# Patient Record
Sex: FEMALE | Race: WHITE | NOT HISPANIC OR LATINO | Employment: UNEMPLOYED | URBAN - NONMETROPOLITAN AREA
[De-identification: names, ages, dates, MRNs, and addresses within clinical notes are randomized per-mention and may not be internally consistent; named-entity substitution may affect disease eponyms.]

---

## 2017-01-23 ENCOUNTER — OFFICE VISIT (OUTPATIENT)
Dept: CARDIOLOGY | Facility: CLINIC | Age: 22
End: 2017-01-23

## 2017-01-23 VITALS
WEIGHT: 102 LBS | DIASTOLIC BLOOD PRESSURE: 62 MMHG | HEIGHT: 61 IN | SYSTOLIC BLOOD PRESSURE: 90 MMHG | BODY MASS INDEX: 19.26 KG/M2 | HEART RATE: 80 BPM

## 2017-01-23 DIAGNOSIS — I34.0 NON-RHEUMATIC MITRAL REGURGITATION: ICD-10-CM

## 2017-01-23 DIAGNOSIS — I45.10 INCOMPLETE RBBB: ICD-10-CM

## 2017-01-23 DIAGNOSIS — R00.2 PALPITATIONS: Primary | ICD-10-CM

## 2017-01-23 DIAGNOSIS — Z79.899 MEDICATION MANAGEMENT: ICD-10-CM

## 2017-01-23 PROCEDURE — 99213 OFFICE O/P EST LOW 20 MIN: CPT | Performed by: NURSE PRACTITIONER

## 2017-01-23 RX ORDER — DIGOXIN 250 MCG
250 TABLET ORAL
COMMUNITY
End: 2017-01-23 | Stop reason: SDUPTHER

## 2017-01-23 RX ORDER — DIGOXIN 250 MCG
250 TABLET ORAL
Qty: 30 TABLET | Refills: 11 | Status: SHIPPED | OUTPATIENT
Start: 2017-01-23 | End: 2017-02-22

## 2017-01-23 NOTE — LETTER
January 23, 2017     SOFIYA Preciado  [address] [place] [id number]    Patient: Delroy Robins   YOB: 1995   Date of Visit: 1/23/2017       Dear SOFIYA Machado:    Delroy Robins was in my office today. Below is a copy of my note.    If you have questions, please do not hesitate to call me. I look forward to following Delroy along with you.         Sincerely,        SOFIYA Sterling        CC: No Recipients    Chief Complaint   Patient presents with   • Follow-up     Denies cardiac problems. Asking for refills on digoxin for 30 day supply to [facility]. States it has been over a year since she had labs checked.        Subjective       Delroy Robins is a 21 y.o. female who was diagnosed with some kind of cardiac problems she was around 7 years old and developed palpitations and had been followed by pediatric cardiologist and was started on Lanoxin. She has done fairly well. In 2016, she seen Dr. Abreu to establish cardiac care. An echocardiogram with bubble study was ordered and results negative for shunting of blood. LV ejection fraction was in normal range. Today she comes to the office for a follow up appointment and no complaints are voiced.   HPI         Cardiac History:    Past Surgical History   Procedure Laterality Date   • Echo - converted  01/25/2016     EF 50-55%, negative bubble study, RVSP 19 mmHg       Current Outpatient Prescriptions   Medication Sig Dispense Refill   • digoxin (LANOXIN) 250 MCG tablet Take 1 tablet by mouth Daily for 30 days. 30 tablet 11     No current facility-administered medications for this visit.        Review of patient's allergies indicates no known allergies.    Past Medical History   Diagnosis Date   • Jaw surgery    • Migraine headache    • Staph    • tubes        Social History     Social History   • Marital status: Single     Spouse name: N/A   • Number of children: N/A   • Years of education: N/A     Occupational History  "  • Not on file.     Social History Main Topics   • Smoking status: Never Smoker   • Smokeless tobacco: Never Used   • Alcohol use No   • Drug use: No   • Sexual activity: Not on file     Other Topics Concern   • Not on file     Social History Narrative       History reviewed. No pertinent family history.    Review of Systems   Constitutional: Negative for activity change, fatigue and fever.   HENT: Negative for congestion, sinus pressure and trouble swallowing.    Eyes: Negative for visual disturbance.   Respiratory: Negative for cough, shortness of breath and wheezing.    Cardiovascular: Negative for chest pain, palpitations and leg swelling.   Gastrointestinal: Negative for abdominal distention and abdominal pain.   Endocrine: Negative for polydipsia, polyphagia and polyuria.   Genitourinary: Negative for difficulty urinating and dysuria.   Musculoskeletal: Negative for gait problem and myalgias.   Skin: Negative for color change.   Neurological: Positive for headaches. Negative for dizziness, syncope, weakness and light-headedness.   Psychiatric/Behavioral: Negative for sleep disturbance. The patient is not nervous/anxious.        Diabetes- No  Thyroid-normal    Objective     Visit Vitals   • BP 90/62   • Pulse 80   • Ht 61\" (154.9 cm)   • Wt 102 lb (46.3 kg)   • BMI 19.27 kg/m2       Physical Exam   Constitutional: She is oriented to person, place, and time. She appears well-developed.   Eyes: Pupils are equal, round, and reactive to light.   Neck: Neck supple. No JVD present.   Cardiovascular: Normal rate, regular rhythm, S1 normal, S2 normal and normal pulses.    Murmur heard.   Systolic murmur is present with a grade of 2/6   Pulmonary/Chest: Effort normal and breath sounds normal.   Abdominal: Soft. Bowel sounds are normal.   Musculoskeletal: She exhibits no edema.   Neurological: She is alert and oriented to person, place, and time.   Skin: Skin is warm and dry.   Psychiatric: She has a normal mood and " affect. Her behavior is normal. Judgment and thought content normal.   Vitals reviewed.    Procedures          Assessment/Plan      Delroy was seen today for follow-up.    Diagnoses and all orders for this visit:    Palpitations    Non-rheumatic mitral regurgitation    Medication management    Incomplete RBBB    Other orders  -     digoxin (LANOXIN) 250 MCG tablet; Take 1 tablet by mouth Daily for 30 days.        She will follow with you for management of labs. Please consider digoxin level with next labs drawn. The report of her echocardiogram was reviewed. No new or worsening symptoms reported therefore no further testing ordered. I encoruaged her to maintain weight. We also discussed decreasing yuli and caffeine in her diet. She reports she does not smoke. Refills given to continue digoxin as currently taking. We will see her back in a year or sooner for problems.            Electronically signed by SOFIYA Sterling,  January 23, 2017 12:29 PM

## 2017-01-23 NOTE — MR AVS SNAPSHOT
Delroy Robins   1/23/2017 12:00 PM   Office Visit    Dept Phone:  [phone]   Encounter #:  [id number]    Provider:  SOFIYA Benedict   Department:  [facility] CARDIOLOGY                Your Full Care Plan              Where to Get Your Medications      These medications were sent to [facility] [place], [place] - [address] [place] [address] - [phone] [facility] [phone]   [facility] [address]     Phone:  [phone]     digoxin 250 MCG tablet            Your Updated Medication List          This list is accurate as of: 1/23/17 12:26 PM.  Always use your most recent med list.                digoxin 250 MCG tablet   Commonly known as:  LANOXIN   Take 1 tablet by mouth Daily for 30 days.               You Were Diagnosed With        Codes Comments    Palpitations    -  Primary ICD-10-CM: R00.2  ICD-9-CM: 785.1     Non-rheumatic mitral regurgitation     ICD-10-CM: I34.0  ICD-9-CM: 424.0     Medication management     ICD-10-CM: Z79.899  ICD-9-CM: V58.69     Incomplete RBBB     ICD-10-CM: I45.10  ICD-9-CM: 426.4       Instructions     None    Patient Instructions History      Upcoming Appointments     Visit Type Date Time Department    FOLLOW UP 1/23/2017 12:00 PM [facility]    FOLLOW UP 1/23/2018  2:00 PM [facility]      MyChart Signup     Our records indicate that you have declined [facility] MyChart signup. If you would like to sign up for [employer], please email [email or web address] or call [phone] to obtain an activation code.             Other Info from Your Visit           Your Appointments     Jan 23, 2018  2:00 PM EST   Follow Up with SOFIYA Fernandes   [facility] CARDIOLOGY (--)    [place] [address] [phone]           Arrive 15 minutes prior to appointment.              Allergies     No Known Allergies      Reason for Visit     Follow-up Denies  "cardiac problems. Asking for refills on digoxin for 30 day supply to [place] [facility]. States it has been over a year since she had labs checked.       Vital Signs     Blood Pressure Pulse Height Weight Body Mass Index Smoking Status    90/62 80 61\" (154.9 cm) 102 lb (46.3 kg) 19.27 kg/m2 Never Smoker      Problems and Diagnoses Noted     Palpitations    -  Primary    Non-rheumatic mitral regurgitation        Pharmacologic therapy        Heart block            "

## 2017-01-23 NOTE — PROGRESS NOTES
Chief Complaint   Patient presents with   • Follow-up     Denies cardiac problems. Asking for refills on digoxin for 30 day supply to [facility]. States it has been over a year since she had labs checked.        Subjective       Delroy Robins is a 21 y.o. female who was diagnosed with some kind of cardiac problems she was around 7 years old and developed palpitations and had been followed by pediatric cardiologist and was started on Lanoxin. She has done fairly well. In 2016, she seen Dr. Abreu to establish cardiac care. An echocardiogram with bubble study was ordered and results negative for shunting of blood. LV ejection fraction was in normal range. Today she comes to the office for a follow up appointment and no complaints are voiced.   HPI         Cardiac History:    Past Surgical History   Procedure Laterality Date   • Echo - converted  01/25/2016     EF 50-55%, negative bubble study, RVSP 19 mmHg       Current Outpatient Prescriptions   Medication Sig Dispense Refill   • digoxin (LANOXIN) 250 MCG tablet Take 1 tablet by mouth Daily for 30 days. 30 tablet 11     No current facility-administered medications for this visit.        Review of patient's allergies indicates no known allergies.    Past Medical History   Diagnosis Date   • Jaw surgery    • Migraine headache    • Staph    • tubes        Social History     Social History   • Marital status: Single     Spouse name: N/A   • Number of children: N/A   • Years of education: N/A     Occupational History   • Not on file.     Social History Main Topics   • Smoking status: Never Smoker   • Smokeless tobacco: Never Used   • Alcohol use No   • Drug use: No   • Sexual activity: Not on file     Other Topics Concern   • Not on file     Social History Narrative       History reviewed. No pertinent family history.    Review of Systems   Constitutional: Negative for activity change, fatigue and fever.   HENT: Negative for congestion, sinus pressure and  "trouble swallowing.    Eyes: Negative for visual disturbance.   Respiratory: Negative for cough, shortness of breath and wheezing.    Cardiovascular: Negative for chest pain, palpitations and leg swelling.   Gastrointestinal: Negative for abdominal distention and abdominal pain.   Endocrine: Negative for polydipsia, polyphagia and polyuria.   Genitourinary: Negative for difficulty urinating and dysuria.   Musculoskeletal: Negative for gait problem and myalgias.   Skin: Negative for color change.   Neurological: Positive for headaches. Negative for dizziness, syncope, weakness and light-headedness.   Psychiatric/Behavioral: Negative for sleep disturbance. The patient is not nervous/anxious.        Diabetes- No  Thyroid-normal    Objective     Visit Vitals   • BP 90/62   • Pulse 80   • Ht 61\" (154.9 cm)   • Wt 102 lb (46.3 kg)   • BMI 19.27 kg/m2       Physical Exam   Constitutional: She is oriented to person, place, and time. She appears well-developed.   Eyes: Pupils are equal, round, and reactive to light.   Neck: Neck supple. No JVD present.   Cardiovascular: Normal rate, regular rhythm, S1 normal, S2 normal and normal pulses.    Murmur heard.   Systolic murmur is present with a grade of 2/6   Pulmonary/Chest: Effort normal and breath sounds normal.   Abdominal: Soft. Bowel sounds are normal.   Musculoskeletal: She exhibits no edema.   Neurological: She is alert and oriented to person, place, and time.   Skin: Skin is warm and dry.   Psychiatric: She has a normal mood and affect. Her behavior is normal. Judgment and thought content normal.   Vitals reviewed.    Procedures          Assessment/Plan      Delroy was seen today for follow-up.    Diagnoses and all orders for this visit:    Palpitations    Non-rheumatic mitral regurgitation    Medication management    Incomplete RBBB    Other orders  -     digoxin (LANOXIN) 250 MCG tablet; Take 1 tablet by mouth Daily for 30 days.        She will follow with you for " management of labs. Please consider digoxin level with next labs drawn. The report of her echocardiogram was reviewed. No new or worsening symptoms reported therefore no further testing ordered. I encoruaged her to maintain weight. We also discussed decreasing yuli and caffeine in her diet. She reports she does not smoke. Refills given to continue digoxin as currently taking. We will see her back in a year or sooner for problems.            Electronically signed by SOFIYA Sterling,  January 23, 2017 12:29 PM

## 2018-01-30 ENCOUNTER — OFFICE VISIT (OUTPATIENT)
Dept: CARDIOLOGY | Facility: CLINIC | Age: 23
End: 2018-01-30

## 2018-01-30 VITALS
DIASTOLIC BLOOD PRESSURE: 70 MMHG | HEART RATE: 72 BPM | BODY MASS INDEX: 19.26 KG/M2 | HEIGHT: 61 IN | WEIGHT: 102 LBS | SYSTOLIC BLOOD PRESSURE: 100 MMHG

## 2018-01-30 DIAGNOSIS — R00.2 PALPITATIONS: Primary | ICD-10-CM

## 2018-01-30 DIAGNOSIS — I34.0 NON-RHEUMATIC MITRAL REGURGITATION: ICD-10-CM

## 2018-01-30 PROCEDURE — 99212 OFFICE O/P EST SF 10 MIN: CPT | Performed by: NURSE PRACTITIONER

## 2018-01-30 RX ORDER — DIGOXIN 250 MCG
250 TABLET ORAL
Qty: 30 TABLET | Refills: 8 | Status: SHIPPED | OUTPATIENT
Start: 2018-01-30 | End: 2019-03-18 | Stop reason: SDUPTHER

## 2018-01-30 RX ORDER — DIGOXIN 250 MCG
250 TABLET ORAL
COMMUNITY
End: 2018-01-30 | Stop reason: SDUPTHER

## 2018-01-30 RX ORDER — LEVOFLOXACIN 750 MG/1
750 TABLET ORAL DAILY
COMMUNITY
End: 2018-08-29

## 2018-01-30 RX ORDER — ACETAMINOPHEN 500 MG
500 TABLET ORAL EVERY 6 HOURS PRN
COMMUNITY
End: 2018-08-29

## 2018-01-30 RX ORDER — LANSOPRAZOLE 30 MG/1
30 CAPSULE, DELAYED RELEASE ORAL DAILY
COMMUNITY
End: 2018-08-29

## 2018-01-30 RX ORDER — ONDANSETRON 4 MG/1
4 TABLET, FILM COATED ORAL EVERY 8 HOURS PRN
COMMUNITY
End: 2019-06-03 | Stop reason: ALTCHOICE

## 2018-01-30 RX ORDER — KETOROLAC TROMETHAMINE 10 MG/1
10 TABLET, FILM COATED ORAL EVERY 6 HOURS PRN
COMMUNITY
End: 2018-08-29

## 2018-01-30 NOTE — PROGRESS NOTES
Chief Complaint   Patient presents with   • Follow-up     For cardiac management. Reports that she was at [facility] for pneumonia last week and had lab work there, not in chart .    • Med Refill     Needs refills on digoxin. 30 day supplys to [facility].        Cardiac Complaints  none      Subjective   Delroy Robins is a 22 y.o. female who was diagnosed with some kind of cardiac problems around 7 years old and she then developed palpitations and followed by pediatric cardiologist and started on Lanoxin. In 2016, she saw Dr. Abreu to establish cardiac care. An echocardiogram with bubble study was ordered and results were negative for shunting of blood and LV ejection fraction was in normal range.  She returns today for follow up and denies any new cardiac concerns.  She does admit to issues with pneumonia which she had last week and was seen at ER at [facility].  She was started on ABX (levaquin) at the time and then discharged.  She does report labs done while in the ER and states they are followed with PCP as well.  She has not seen her for sometime.  Refills of digoxin requested for 30 day supply.        Cardiac History  Past Surgical History:   Procedure Laterality Date   • ECHO - CONVERTED  01/25/2016    EF 50-55%, negative bubble study, RVSP 19 mmHg       Current Outpatient Prescriptions   Medication Sig Dispense Refill   • acetaminophen (TYLENOL) 500 MG tablet Take 500 mg by mouth Every 6 (Six) Hours As Needed for Mild Pain .     • digoxin (LANOXIN) 250 MCG tablet Take 1 tablet by mouth Daily. 30 tablet 8   • ketorolac (TORADOL) 10 MG tablet Take 10 mg by mouth Every 6 (Six) Hours As Needed for Moderate Pain .     • lansoprazole (PREVACID) 30 MG capsule Take 30 mg by mouth Daily.     • levoFLOXacin (LEVAQUIN) 750 MG tablet Take 750 mg by mouth Daily.     • ondansetron (ZOFRAN) 4 MG tablet Take 4 mg by mouth Every 8 (Eight) Hours As Needed for Nausea or Vomiting.     • sertraline (ZOLOFT) 50 MG tablet Take 50  "mg by mouth Daily.       No current facility-administered medications for this visit.        Review of patient's allergies indicates no known allergies.    Past Medical History:   Diagnosis Date   • Jaw surgery    • Migraine headache    • Staph    • tubes        Social History     Social History   • Marital status: Single     Spouse name: N/A   • Number of children: N/A   • Years of education: N/A     Occupational History   • Not on file.     Social History Main Topics   • Smoking status: Never Smoker   • Smokeless tobacco: Never Used   • Alcohol use No   • Drug use: No   • Sexual activity: Not on file     Other Topics Concern   • Not on file     Social History Narrative       History reviewed. No pertinent family history.    Review of Systems   Constitution: Negative for weakness.   Cardiovascular: Negative for chest pain, dyspnea on exertion, near-syncope, palpitations and syncope.   Respiratory: Negative for shortness of breath and wheezing.    Musculoskeletal: Negative for joint pain and joint swelling.   Gastrointestinal: Negative for anorexia, heartburn and nausea.   Genitourinary: Negative for dysuria, hematuria and nocturia.   Neurological: Negative for dizziness, loss of balance and numbness.   Psychiatric/Behavioral: Negative for depression and memory loss.       DiabetesNo  Thyroidnormal    Objective     /70 (BP Location: Left arm)  Pulse 72  Ht 154.9 cm (60.98\")  Wt 46.3 kg (102 lb)  BMI 19.28 kg/m2    Physical Exam   Constitutional: She is oriented to person, place, and time. She appears well-developed and well-nourished.   HENT:   Head: Normocephalic and atraumatic.   Eyes: EOM are normal. Pupils are equal, round, and reactive to light.   Neck: Normal range of motion. Neck supple.   Cardiovascular: Normal rate and regular rhythm.    Murmur heard.  Pulmonary/Chest: Effort normal and breath sounds normal.   Abdominal: Soft.   Musculoskeletal: Normal range of motion.   Neurological: She is alert " and oriented to person, place, and time.   Skin: Skin is warm and dry.   Psychiatric: She has a normal mood and affect. Her behavior is normal.       Procedures    Assessment/Plan     HR and BP are stable today.  No changes to current regimen advised.  Current digoxin therapy will be continued for palpitations.  Refills of medication sent per request. Limited caffeine intake advised. No new cardiac workup advised today as no new concerns are voiced.  Labs are done with your office and recently were done at the ER when she was there for pneumonia.  Patient urged to keep follow up with you as she has not seen you since leaving the hospital.  Please add digoxin level to next labs.  Weight has remained stable.  6 month follow up will be scheduled or sooner if needed.      Problems Addressed this Visit        Cardiovascular and Mediastinum    Palpitations - Primary    Non-rheumatic mitral regurgitation    Relevant Medications    digoxin (LANOXIN) 250 MCG tablet                  Electronically signed by SOFIYA Valencia January 30, 2018 4:05 PM

## 2018-08-29 ENCOUNTER — OFFICE VISIT (OUTPATIENT)
Dept: CARDIOLOGY | Facility: CLINIC | Age: 23
End: 2018-08-29

## 2018-08-29 VITALS
HEART RATE: 88 BPM | WEIGHT: 99.38 LBS | SYSTOLIC BLOOD PRESSURE: 92 MMHG | HEIGHT: 61 IN | DIASTOLIC BLOOD PRESSURE: 52 MMHG | BODY MASS INDEX: 18.76 KG/M2

## 2018-08-29 DIAGNOSIS — R63.4 WEIGHT LOSS: ICD-10-CM

## 2018-08-29 DIAGNOSIS — Z79.899 MEDICATION MANAGEMENT: ICD-10-CM

## 2018-08-29 DIAGNOSIS — R00.2 PALPITATIONS: Primary | ICD-10-CM

## 2018-08-29 DIAGNOSIS — I34.0 NON-RHEUMATIC MITRAL REGURGITATION: ICD-10-CM

## 2018-08-29 PROCEDURE — 99213 OFFICE O/P EST LOW 20 MIN: CPT | Performed by: NURSE PRACTITIONER

## 2018-08-29 RX ORDER — SERTRALINE HYDROCHLORIDE 100 MG/1
100 TABLET, FILM COATED ORAL DAILY
COMMUNITY
End: 2019-06-03 | Stop reason: DRUGHIGH

## 2018-08-29 NOTE — PATIENT INSTRUCTIONS
Palpitations  A palpitation is the feeling that your heart:  · Has an uneven (irregular) heartbeat.  · Is beating faster than normal.  · Is fluttering.  · Is skipping a beat.    This is usually not a serious problem. In some cases, you may need more medical tests.  Follow these instructions at home:  · Avoid:  ? Caffeine in coffee, tea, soft drinks, diet pills, and energy drinks.  ? Chocolate.  ? Alcohol.  · Do not use any tobacco products. These include cigarettes, chewing tobacco, and e-cigarettes. If you need help quitting, ask your doctor.  · Try to reduce your stress. These things may help:  ? Yoga.  ? Meditation.  ? Physical activity. Swimming, jogging, and walking are good choices.  ? A method that helps you use your mind to control things in your body, like heartbeats (biofeedback).  · Get plenty of rest and sleep.  · Take over-the-counter and prescription medicines only as told by your doctor.  · Keep all follow-up visits as told by your doctor. This is important.  Contact a doctor if:  · Your heartbeat is still fast or uneven after 24 hours.  · Your palpitations occur more often.  Get help right away if:  · You have chest pain.  · You feel short of breath.  · You have a very bad headache.  · You feel dizzy.  · You pass out (faint).  This information is not intended to replace advice given to you by your health care provider. Make sure you discuss any questions you have with your health care provider.  Document Released: 09/26/2009 Document Revised: 05/25/2017 Document Reviewed: 09/01/2016  [employer]vier Interactive Patient Education © 2018 [employer]

## 2018-08-29 NOTE — PROGRESS NOTES
Chief Complaint   Patient presents with   • Follow-up     For cardiac management. Says she sees a gastro doctor in [place] who put her on Amitiza. She states that she is fine with the first dose, but at the second dose she has chest pain and heart racing. Stated the first time it happened it only lasted a couple hours now its lasting several hours at a time. Denies palpitations and shortness of breath   • Med Refill     Unsure if refills needed. No labs.. Did not bring med list. DOES NOT TAKE ASPIRIN       Subjective       Delroy Robins is a 23 y.o. female diagnosed with some kind of cardiac problems around 7 years old and she then developed palpitations and followed by pediatric cardiologist and started on Lanoxin. In 2016, she saw Dr. Abreu to establish cardiac care. An echocardiogram with bubble study was ordered and results were negative for shunting of blood and LV ejection fraction was in normal range.   Today she comes to the office due to development of palpitations. She reports she started a new medication, amitiza, and developed brief palpitations which worsened after she took a few doses. Since that time she has stopped taking the medication and denies any reoccurrence of palpitations or fast heart rate. She is maintaining her normal activities and came to office today after working a 12 hour shift.     HPI     Cardiac History:    Past Surgical History:   Procedure Laterality Date   • ECHO - CONVERTED  01/25/2016    EF 50-55%, negative bubble study, RVSP 19 mmHg       Current Outpatient Prescriptions   Medication Sig Dispense Refill   • digoxin (LANOXIN) 250 MCG tablet Take 1 tablet by mouth Daily. 30 tablet 8   • ondansetron (ZOFRAN) 4 MG tablet Take 4 mg by mouth Every 8 (Eight) Hours As Needed for Nausea or Vomiting.     • sertraline (ZOLOFT) 100 MG tablet Take 100 mg by mouth Daily.       No current facility-administered medications for this visit.        Patient has no known allergies.    Past  "Medical History:   Diagnosis Date   • Jaw surgery    • Migraine headache    • Staph    • tubes        Social History     Social History   • Marital status: Single     Spouse name: N/A   • Number of children: N/A   • Years of education: N/A     Occupational History   • Not on file.     Social History Main Topics   • Smoking status: Never Smoker   • Smokeless tobacco: Never Used   • Alcohol use No   • Drug use: No   • Sexual activity: Not on file     Other Topics Concern   • Not on file     Social History Narrative   • No narrative on file       No family history on file.    Review of Systems   Constitution: Positive for diaphoresis (once with palpitations). Negative for decreased appetite and malaise/fatigue.   HENT: Negative for congestion and nosebleeds.    Eyes: Negative for blurred vision and visual disturbance.   Cardiovascular: Positive for palpitations. Negative for chest pain and leg swelling.   Respiratory: Negative for shortness of breath and sleep disturbances due to breathing.    Endocrine: Negative for polydipsia, polyphagia and polyuria.   Hematologic/Lymphatic: Negative for bleeding problem. Does not bruise/bleed easily.   Skin: Negative for color change and itching.   Musculoskeletal: Negative for joint pain and muscle cramps.   Gastrointestinal: Negative for abdominal pain, melena and nausea.   Genitourinary: Negative for dysuria and hematuria.   Neurological: Negative for dizziness, headaches and loss of balance.   Psychiatric/Behavioral: The patient is nervous/anxious. The patient does not have insomnia.    Allergic/Immunologic: Negative for hives.        Objective     BP 92/52   Pulse 88   Ht 154.9 cm (61\")   Wt 45.1 kg (99 lb 6 oz)   BMI 18.78 kg/m²     Physical Exam   Constitutional: She is oriented to person, place, and time. She appears well-nourished. No distress.   HENT:   Head: Normocephalic.   Eyes: Pupils are equal, round, and reactive to light. Conjunctivae are normal.   Neck: Normal " range of motion. Neck supple.   Cardiovascular: Normal rate, regular rhythm, S1 normal and S2 normal.    No murmur heard.  Pulmonary/Chest: Breath sounds normal.   Abdominal: Soft. Bowel sounds are normal. There is no tenderness.   Musculoskeletal: Normal range of motion. She exhibits no edema.   Neurological: She is alert and oriented to person, place, and time.   Skin: Skin is warm and dry. No pallor.   Psychiatric: She has a normal mood and affect. Her behavior is normal.      Procedures: none today        Assessment/Plan      Delroy was seen today for follow-up and med refill.    Diagnoses and all orders for this visit:    Palpitations  -     TSH; Future  -     CBC & Differential; Future  -     Comprehensive Metabolic Panel; Future  -     Digoxin Level; Future    Non-rheumatic mitral regurgitation  -     TSH; Future  -     CBC & Differential; Future  -     Comprehensive Metabolic Panel; Future  -     Digoxin Level; Future    Medication management  -     TSH; Future  -     CBC & Differential; Future  -     Comprehensive Metabolic Panel; Future  -     Digoxin Level; Future    Weight loss      Palpitations have subsided after stopping a medication. I have given her a lab order which includes digoxin level. I have asked for a copy of results to also be sent to you. At this time, I did not place a cardiac monitor due to symptoms subsiding.  If palpitations reoccur, she agrees to call and a cardiac monitor will be advised.     No medication changes made today.     Patient's Body mass index is 18.78 kg/m². BMI is within normal parameters. No follow-up required. I encouraged her on maintaining weight.     A handout on palpitations given to her. We discussed maintaining good hydration and avoiding caffeine. I encouraged her on adequate rest and managing stress.     A follow up visit scheduled. Please call sooner for cardiac concerns.            Electronically signed by SOFIYA Sterling,  August 29, 2018 11:13 AM

## 2019-01-07 ENCOUNTER — TELEPHONE (OUTPATIENT)
Dept: CARDIOLOGY | Facility: CLINIC | Age: 24
End: 2019-01-07

## 2019-01-07 NOTE — TELEPHONE ENCOUNTER
Agree. She was advised to have labs done last office visit. I dont have results. She may have had done at PCP office. If not, advise to have labs done if she has not gone to ER. Thanks.

## 2019-01-07 NOTE — TELEPHONE ENCOUNTER
FYI    Patient called and reported that she has been taking digoxin 250 mg QD, is supposed to take a whole tablet, but was told she could take a half a tablet if it was making her sick, so that is how she has been taking.     She reports that she woke up this morning having chest pains, and hands and arms were numb, and she was having trouble breathing.     She was told that she needed to go to the ER to be evaluated. She said that it has subsided, but that if chest pains returned that she would go to the ER.

## 2019-03-19 NOTE — TELEPHONE ENCOUNTER
Phoned patient concerning if patient is taking Digoxin and dose, patient reports she is taking yet does not know what dose, advised patient to call back with current dose and bring medication bottles to next office visit, patient verbalized understanding.

## 2019-03-20 RX ORDER — DIGOXIN 250 MCG
250 TABLET ORAL
Qty: 30 TABLET | Refills: 0 | Status: SHIPPED | OUTPATIENT
Start: 2019-03-20 | End: 2019-06-03 | Stop reason: ALTCHOICE

## 2019-06-03 ENCOUNTER — OFFICE VISIT (OUTPATIENT)
Dept: CARDIOLOGY | Facility: CLINIC | Age: 24
End: 2019-06-03

## 2019-06-03 VITALS
HEART RATE: 60 BPM | DIASTOLIC BLOOD PRESSURE: 58 MMHG | SYSTOLIC BLOOD PRESSURE: 98 MMHG | HEIGHT: 61 IN | BODY MASS INDEX: 18.35 KG/M2 | WEIGHT: 97.2 LBS

## 2019-06-03 DIAGNOSIS — I34.0 NON-RHEUMATIC MITRAL REGURGITATION: ICD-10-CM

## 2019-06-03 DIAGNOSIS — Z79.899 MEDICATION MANAGEMENT: ICD-10-CM

## 2019-06-03 DIAGNOSIS — F41.9 ANXIETY: ICD-10-CM

## 2019-06-03 DIAGNOSIS — R00.2 PALPITATIONS: Primary | ICD-10-CM

## 2019-06-03 DIAGNOSIS — R63.4 WEIGHT LOSS: ICD-10-CM

## 2019-06-03 PROCEDURE — 99213 OFFICE O/P EST LOW 20 MIN: CPT | Performed by: NURSE PRACTITIONER

## 2019-06-03 RX ORDER — DIGOXIN 125 MCG
125 TABLET ORAL
Qty: 30 TABLET | Refills: 11 | Status: SHIPPED | OUTPATIENT
Start: 2019-06-03 | End: 2019-12-02 | Stop reason: DRUGHIGH

## 2019-06-03 RX ORDER — DIGOXIN 125 MCG
125 TABLET ORAL
COMMUNITY
End: 2019-06-03 | Stop reason: SDUPTHER

## 2019-06-03 NOTE — PROGRESS NOTES
Chief Complaint   Patient presents with   • Follow-up     cardiac management . Had ER visit with diagnosis  of Pleurisy . Most recent labs per  Em Long  at [facility]       Delroy Robins is a 23 y.o. female diagnosed with some kind of cardiac problems around 7 years old and she then developed palpitations and followed by pediatric cardiologist and started on Lanoxin. In 2016, she saw Dr. Abreu to establish cardiac care. An echocardiogram with bubble study was ordered and results were negative for shunting of blood and LV ejection fraction was in normal range. IN August 2018, she was seen due to palpitations but felt were related to amitiza. After stopping the medication, her palpitations had improved. A lab order was given.     Today she comes to the office for a follow up visit.  She denies chest pain, palpitations, shortness of breath.  She is maintaining her normal daily activities without issue.  The dose of Lanoxin was decreased from 250 mcg daily to 125 mcg daily.    HPI     Cardiac History:    Past Surgical History:   Procedure Laterality Date   • ECHO - CONVERTED  01/25/2016    EF 50-55%, negative bubble study, RVSP 19 mmHg       Current Outpatient Medications   Medication Sig Dispense Refill   • digoxin (LANOXIN) 125 MCG tablet Take 1 tablet by mouth Daily. 30 tablet 11   • sertraline (ZOLOFT) 50 MG tablet Take 50 mg by mouth Daily.       No current facility-administered medications for this visit.        Patient has no known allergies.    Past Medical History:   Diagnosis Date   • Jaw surgery    • Migraine headache    • Staph    • tubes        Social History     Socioeconomic History   • Marital status: Single     Spouse name: Not on file   • Number of children: Not on file   • Years of education: Not on file   • Highest education level: Not on file   Tobacco Use   • Smoking status: Never Smoker   • Smokeless tobacco: Never Used   Substance and Sexual  "Activity   • Alcohol use: No   • Drug use: No       No family history on file.    Review of Systems   Constitution: Positive for weight loss. Negative for decreased appetite and malaise/fatigue.   HENT: Negative for congestion and nosebleeds.    Eyes: Negative for redness and visual disturbance.   Cardiovascular: Negative for chest pain, leg swelling, near-syncope and palpitations.   Respiratory: Negative for cough and shortness of breath.    Endocrine: Negative for polydipsia, polyphagia and polyuria.   Hematologic/Lymphatic: Negative for bleeding problem. Does not bruise/bleed easily.   Skin: Negative for dry skin and itching.   Musculoskeletal: Negative for joint pain, muscle cramps and muscle weakness.   Gastrointestinal: Negative for abdominal pain, heartburn and melena.   Genitourinary: Negative for dysuria and hematuria.   Neurological: Negative for dizziness, light-headedness and loss of balance.   Psychiatric/Behavioral: The patient is nervous/anxious. The patient does not have insomnia.    Allergic/Immunologic: Negative for hives and persistent infections.        Objective     BP 98/58 (BP Location: Left arm)   Pulse 60   Ht 154.9 cm (61\")   Wt 44.1 kg (97 lb 3.2 oz)   BMI 18.37 kg/m²     Physical Exam   Constitutional: She is oriented to person, place, and time. She appears well-nourished.   HENT:   Head: Normocephalic.   Eyes: Conjunctivae are normal. Pupils are equal, round, and reactive to light.   Neck: Normal range of motion. Neck supple.   Cardiovascular: Normal rate, regular rhythm, S1 normal and S2 normal.   No murmur heard.  Pulmonary/Chest: Effort normal and breath sounds normal.   Abdominal: Soft. Bowel sounds are normal. She exhibits no distension. There is no tenderness.   Musculoskeletal: Normal range of motion. She exhibits no edema.   Neurological: She is alert and oriented to person, place, and time.   Skin: Skin is warm and dry. No pallor.   Psychiatric: She has a normal mood and " affect.      Procedures:none today      Assessment/Plan      Delroy was seen today for follow-up.    Diagnoses and all orders for this visit:    Palpitations  -     digoxin (LANOXIN) 125 MCG tablet; Take 1 tablet by mouth Daily.    Non-rheumatic mitral regurgitation    Medication management    Weight loss    Anxiety    Ruben reports recent labs have been done.  Please forward a copy of lab results when available.  She states digoxin level was checked which was normal.  Her palpitations remain controlled.  We will continue low-dose Lanoxin at this time and refills given.    Patient's Body mass index is 18.37 kg/m². BMI is within normal parameters. No follow-up required.  Her weight is down a couple pounds from last visit.  I advised her to avoid further weight loss and encouraged a goal weight of 100 pounds.  She admits to issues with her stomach and follows with GI.  Advised her to further discuss with them dietary management.     For management of anxiety she finds Zoloft beneficial and will follow with you for management.    Her blood pressure, heart rate, and heart rhythm today are normal.  She does not appear to have a loud cardiac murmur.  She denies cardiac symptoms or concerns.  At this time she appears stable therefore no cardiac testing warranted.  We will see her on an annual basis.  Please call sooner for any cardiac concerns.            Electronically signed by SOFIYA Sterling,  June 4, 2019 12:21 PM

## 2019-10-23 ENCOUNTER — TELEPHONE (OUTPATIENT)
Dept: CARDIOLOGY | Facility: CLINIC | Age: 24
End: 2019-10-23

## 2019-10-23 NOTE — TELEPHONE ENCOUNTER
Pt Lm asking to be call back, occasional chest pain for the past week.  Called pt, no answer and unable to leave message.

## 2019-10-24 ENCOUNTER — TELEPHONE (OUTPATIENT)
Dept: CARDIOLOGY | Facility: CLINIC | Age: 24
End: 2019-10-24

## 2019-10-24 NOTE — TELEPHONE ENCOUNTER
"Patient called and said she has been having chest pain , with exertion and at rest . She states \" the pain hurts really bad \" She was advised to go to ER if pain is unbearable.  "

## 2019-10-25 NOTE — TELEPHONE ENCOUNTER
She is scheduled to come in Monday at 8:15 am , she is advised if pain is severe or other cardiac symptoms arise to go to ER.

## 2019-10-28 ENCOUNTER — CLINICAL SUPPORT (OUTPATIENT)
Dept: CARDIOLOGY | Facility: CLINIC | Age: 24
End: 2019-10-28

## 2019-10-28 ENCOUNTER — OFFICE VISIT (OUTPATIENT)
Dept: CARDIOLOGY | Facility: CLINIC | Age: 24
End: 2019-10-28

## 2019-10-28 ENCOUNTER — LAB (OUTPATIENT)
Dept: LAB | Facility: HOSPITAL | Age: 24
End: 2019-10-28

## 2019-10-28 VITALS
DIASTOLIC BLOOD PRESSURE: 62 MMHG | WEIGHT: 97 LBS | BODY MASS INDEX: 18.31 KG/M2 | HEART RATE: 72 BPM | SYSTOLIC BLOOD PRESSURE: 100 MMHG | HEIGHT: 61 IN

## 2019-10-28 DIAGNOSIS — R00.2 PALPITATIONS: ICD-10-CM

## 2019-10-28 DIAGNOSIS — Z79.899 MEDICATION MANAGEMENT: ICD-10-CM

## 2019-10-28 DIAGNOSIS — R07.89 OTHER CHEST PAIN: ICD-10-CM

## 2019-10-28 DIAGNOSIS — R06.02 SHORTNESS OF BREATH: ICD-10-CM

## 2019-10-28 DIAGNOSIS — R07.89 OTHER CHEST PAIN: Primary | ICD-10-CM

## 2019-10-28 DIAGNOSIS — R12 HEARTBURN: ICD-10-CM

## 2019-10-28 DIAGNOSIS — I34.0 NON-RHEUMATIC MITRAL REGURGITATION: ICD-10-CM

## 2019-10-28 LAB
ALBUMIN SERPL-MCNC: 4.24 G/DL (ref 3.5–5.2)
ALBUMIN/GLOB SERPL: 1.4 G/DL
ALP SERPL-CCNC: 74 U/L (ref 39–117)
ALT SERPL W P-5'-P-CCNC: 11 U/L (ref 1–33)
ANION GAP SERPL CALCULATED.3IONS-SCNC: 8.8 MMOL/L (ref 5–15)
AST SERPL-CCNC: 11 U/L (ref 1–32)
BASOPHILS # BLD AUTO: 0.08 10*3/MM3 (ref 0–0.2)
BASOPHILS NFR BLD AUTO: 0.9 % (ref 0–1.5)
BILIRUB SERPL-MCNC: <0.2 MG/DL (ref 0.2–1.2)
BUN BLD-MCNC: 12 MG/DL (ref 6–20)
BUN/CREAT SERPL: 18.5 (ref 7–25)
CALCIUM SPEC-SCNC: 9.4 MG/DL (ref 8.6–10.5)
CHLORIDE SERPL-SCNC: 102 MMOL/L (ref 98–107)
CO2 SERPL-SCNC: 27.2 MMOL/L (ref 22–29)
CREAT BLD-MCNC: 0.65 MG/DL (ref 0.57–1)
DEPRECATED RDW RBC AUTO: 53.1 FL (ref 37–54)
DIGOXIN SERPL-MCNC: 1.3 NG/ML (ref 0.6–1.2)
EOSINOPHIL # BLD AUTO: 0.57 10*3/MM3 (ref 0–0.4)
EOSINOPHIL NFR BLD AUTO: 6.5 % (ref 0.3–6.2)
ERYTHROCYTE [DISTWIDTH] IN BLOOD BY AUTOMATED COUNT: 15.7 % (ref 12.3–15.4)
GFR SERPL CREATININE-BSD FRML MDRD: 112 ML/MIN/1.73
GLOBULIN UR ELPH-MCNC: 3.1 GM/DL
GLUCOSE BLD-MCNC: 98 MG/DL (ref 65–99)
HCT VFR BLD AUTO: 36.1 % (ref 34–46.6)
HGB BLD-MCNC: 10.8 G/DL (ref 12–15.9)
IMM GRANULOCYTES # BLD AUTO: 0.02 10*3/MM3 (ref 0–0.05)
IMM GRANULOCYTES NFR BLD AUTO: 0.2 % (ref 0–0.5)
LYMPHOCYTES # BLD AUTO: 3.2 10*3/MM3 (ref 0.7–3.1)
LYMPHOCYTES NFR BLD AUTO: 36.3 % (ref 19.6–45.3)
MAGNESIUM SERPL-MCNC: 2.1 MG/DL (ref 1.6–2.6)
MCH RBC QN AUTO: 27.7 PG (ref 26.6–33)
MCHC RBC AUTO-ENTMCNC: 29.9 G/DL (ref 31.5–35.7)
MCV RBC AUTO: 92.6 FL (ref 79–97)
MONOCYTES # BLD AUTO: 0.64 10*3/MM3 (ref 0.1–0.9)
MONOCYTES NFR BLD AUTO: 7.3 % (ref 5–12)
NEUTROPHILS # BLD AUTO: 4.31 10*3/MM3 (ref 1.7–7)
NEUTROPHILS NFR BLD AUTO: 48.8 % (ref 42.7–76)
NRBC BLD AUTO-RTO: 0 /100 WBC (ref 0–0.2)
PLATELET # BLD AUTO: 365 10*3/MM3 (ref 140–450)
PMV BLD AUTO: 10.4 FL (ref 6–12)
POTASSIUM BLD-SCNC: 3.9 MMOL/L (ref 3.5–5.2)
PROT SERPL-MCNC: 7.3 G/DL (ref 6–8.5)
RBC # BLD AUTO: 3.9 10*6/MM3 (ref 3.77–5.28)
SODIUM BLD-SCNC: 138 MMOL/L (ref 136–145)
TSH SERPL DL<=0.05 MIU/L-ACNC: 2.31 UIU/ML (ref 0.27–4.2)
WBC NRBC COR # BLD: 8.82 10*3/MM3 (ref 3.4–10.8)

## 2019-10-28 PROCEDURE — 80162 ASSAY OF DIGOXIN TOTAL: CPT | Performed by: NURSE PRACTITIONER

## 2019-10-28 PROCEDURE — 36415 COLL VENOUS BLD VENIPUNCTURE: CPT

## 2019-10-28 PROCEDURE — 0296T PR EXT ECG > 48HR TO 21 DAY RCRD W/CONECT INTL RCRD: CPT | Performed by: INTERNAL MEDICINE

## 2019-10-28 PROCEDURE — 83735 ASSAY OF MAGNESIUM: CPT | Performed by: NURSE PRACTITIONER

## 2019-10-28 PROCEDURE — 80050 GENERAL HEALTH PANEL: CPT | Performed by: NURSE PRACTITIONER

## 2019-10-28 PROCEDURE — 99214 OFFICE O/P EST MOD 30 MIN: CPT | Performed by: NURSE PRACTITIONER

## 2019-10-28 NOTE — PATIENT INSTRUCTIONS
Palpitations  Palpitations are feelings that your heartbeat is irregular or is faster than normal. It may feel like your heart is fluttering or skipping a beat. Palpitations are usually not a serious problem. They may be caused by many things, including smoking, caffeine, alcohol, stress, and certain medicines or drugs. Most causes of palpitations are not serious. However, some palpitations can be a sign of a serious problem. You may need further tests to rule out serious medical problems.  Follow these instructions at home:    Pay attention to any changes in your condition. Take these actions to help manage your symptoms:  Eating and drinking  · Avoid foods and drinks that may cause palpitations. These may include:  ? Caffeinated coffee, tea, soft drinks, diet pills, and energy drinks.  ? Chocolate.  ? Alcohol.  Lifestyle  · Take steps to reduce your stress and anxiety. Things that can help you relax include:  ? Yoga.  ? Mind-body activities, such as deep breathing, meditation, or using words and images to create positive thoughts (guided imagery).  ? Physical activity, such as swimming, jogging, or walking. Tell your health care provider if your palpitations increase with activity. If you have chest pain or shortness of breath with activity, do not continue the activity until you are seen by your health care provider.  ? Biofeedback. This is a method that helps you learn to use your mind to control things in your body, such as your heartbeat.  · Do not use drugs, including cocaine or ecstasy. Do not use marijuana.  · Get plenty of rest and sleep. Keep a regular bed time.  General instructions  · Take over-the-counter and prescription medicines only as told by your health care provider.  · Do not use any products that contain nicotine or tobacco, such as cigarettes and e-cigarettes. If you need help quitting, ask your health care provider.  · Keep all follow-up visits as told by your health care provider. This is  important. These may include visits for further testing if palpitations do not go away or get worse.  Contact a health care provider if you:  · Continue to have a fast or irregular heartbeat after 24 hours.  · Notice that your palpitations occur more often.  Get help right away if you:  · Have chest pain or shortness of breath.  · Have a severe headache.  · Feel dizzy or you faint.  Summary  · Palpitations are feelings that your heartbeat is irregular or is faster than normal. It may feel like your heart is fluttering or skipping a beat.  · Palpitations may be caused by many things, including smoking, caffeine, alcohol, stress, certain medicines, and drugs.  · Although most causes of palpitations are not serious, some causes can be a sign of a serious medical problem.  · Get help right away if you faint or have chest pain, shortness of breath, a severe headache, or dizziness.  This information is not intended to replace advice given to you by your health care provider. Make sure you discuss any questions you have with your health care provider.  Document Released: 12/15/2001 Document Revised: 01/30/2019 Document Reviewed: 01/30/2019  [employer] Interactive Patient Education © 2019 [employer]

## 2019-10-28 NOTE — PROGRESS NOTES
Chief Complaint   Patient presents with   • Follow-up     Patient is her this morning for c/o chest pain, chest pain daily. Sharp Pain starts mid chest and radiates down left arm. No current labs on chart.   • Palpitations     Continues to have palpitations.   • Shortness of Breath     With chest pain    • Med Refill     Reviewed meds verbally       Subjective       Delroy Robins is a 24 y.o. female diagnosed with some kind of cardiac problems around 7 years old and she then developed palpitations and followed by pediatric cardiologist and started on Lanoxin. In 2016, she saw Dr. Abreu to establish cardiac care. An echocardiogram with bubble study was ordered and results were negative for shunting of blood and LV ejection fraction was in normal range. IN August 2018, she was seen due to palpitations but felt were related to amitiza. After stopping the medication, her palpitations had improved. Later, the dose of Lanoxin was decreased to 125 mcg daily.     No recent labs available.     Recently she called the office to report episodes of severe chest pain and palpitations. She comes today for further evaluation.      Palpitations    This is a new problem. The current episode started in the past 7 days. The problem occurs intermittently. The problem has been gradually worsening. The symptoms are aggravated by unknown. Associated symptoms include anxiety, chest pain and shortness of breath. Pertinent negatives include no dizziness, nausea, near-syncope, syncope or weakness. The treatment provided no relief.        Cardiac History:    Past Surgical History:   Procedure Laterality Date   • ECHO - CONVERTED  01/25/2016    EF 50-55%, negative bubble study, RVSP 19 mmHg       Current Outpatient Medications   Medication Sig Dispense Refill   • digoxin (LANOXIN) 125 MCG tablet Take 1 tablet by mouth Daily. 30 tablet 11   • sertraline (ZOLOFT) 50 MG tablet Take 50 mg by mouth Daily.       No current facility-administered  "medications for this visit.        Patient has no known allergies.    Past Medical History:   Diagnosis Date   • Jaw surgery    • Migraine headache    • Staph    • tubes        Social History     Socioeconomic History   • Marital status: Single     Spouse name: Not on file   • Number of children: Not on file   • Years of education: Not on file   • Highest education level: Not on file   Tobacco Use   • Smoking status: Never Smoker   • Smokeless tobacco: Never Used   Substance and Sexual Activity   • Alcohol use: No   • Drug use: No       History reviewed. No pertinent family history.    Review of Systems   Constitution: Negative for weakness.   HENT: Negative for congestion, hoarse voice, nosebleeds and odynophagia.    Eyes: Negative.    Cardiovascular: Positive for chest pain and palpitations. Negative for leg swelling, near-syncope and syncope.   Respiratory: Positive for shortness of breath.    Endocrine: Negative.    Hematologic/Lymphatic: Negative.    Skin: Negative.    Musculoskeletal: Negative.    Gastrointestinal: Positive for abdominal pain (not a new problem, reports has stomach pain all the time. ) and heartburn. Negative for change in bowel habit, dysphagia, melena and nausea.   Genitourinary: Negative.    Neurological: Negative for dizziness.   Psychiatric/Behavioral: Negative for altered mental status. The patient is nervous/anxious. The patient does not have insomnia.    Allergic/Immunologic: Negative for hives and persistent infections.        Objective     /62   Pulse 72   Ht 154.9 cm (61\")   Wt 44 kg (97 lb)   BMI 18.33 kg/m²     Physical Exam   Constitutional: She is oriented to person, place, and time. Vital signs are normal. She appears well-nourished. No distress.   HENT:   Head: Normocephalic.   Eyes: Conjunctivae are normal. Pupils are equal, round, and reactive to light.   Neck: Normal range of motion. Neck supple.   Cardiovascular: Normal rate, regular rhythm, S1 normal and S2 " normal.   No murmur heard.  Pulmonary/Chest: Effort normal and breath sounds normal. She has no wheezes. She has no rales.   Abdominal: Soft. Bowel sounds are normal. She exhibits no distension. There is no tenderness. There is no guarding.   Musculoskeletal: Normal range of motion. She exhibits no edema.   Neurological: She is alert and oriented to person, place, and time.   Skin: Skin is warm and dry. No pallor.   Psychiatric: She has a normal mood and affect. Her behavior is normal.        Procedures: Holter monitor placed        Assessment/Plan      Delroy was seen today for follow-up, palpitations, shortness of breath and med refill.    Diagnoses and all orders for this visit:    Other chest pain  -     CBC & Differential; Future  -     Comprehensive Metabolic Panel; Future  -     TSH; Future  -     Magnesium; Future  -     Holter Monitor - 72 Hour Up To 21 Days; Future    Palpitations  -     CBC & Differential; Future  -     Comprehensive Metabolic Panel; Future  -     TSH; Future  -     Digoxin Level; Future  -     Magnesium; Future  -     Holter Monitor - 72 Hour Up To 21 Days; Future    Non-rheumatic mitral regurgitation    Medication management  -     CBC & Differential; Future  -     Comprehensive Metabolic Panel; Future  -     TSH; Future  -     Digoxin Level; Future  -     Magnesium; Future    Shortness of breath  -     CBC & Differential; Future  -     Comprehensive Metabolic Panel; Future    Heartburn    Chest pain/palpitations- her symptoms presented over the past week and are progressively worsening.  We will place a cardiac monitor to further evaluate palpitations.  Her chest pain appears atypical.  She admits to consuming quite a few energy drinks daily.  I instructed her on side effects including the palpitations.  A handout on heart palpitations and triggers to avoid was also given to her.  Her blood pressure, heart rate, and heart rhythm today are normal.  At this time same dose of Digoxin  advised.  A lab order given which includes digoxin level.  Further recommendations based on cardiac monitor results.    Auscultation of cardiac murmur today appears stable.  If symptoms persist will consider repeat echo and regular treadmill stress test.    Patient's Body mass index is 18.33 kg/m². BMI is within normal parameters. No follow-up required.  Weight is unchanged from prior visit.  She reports good appetite.     Heartburn- follows with gastroenterologist in [place].  She states she has tried numerous medications without benefit.    We will bring her back in 4 weeks. If symptoms worsen, she understands to call sooner.           Electronically signed by SOFIYA Sterling,  October 28, 2019 9:08 AM

## 2019-11-18 ENCOUNTER — OUTSIDE FACILITY SERVICE (OUTPATIENT)
Dept: CARDIOLOGY | Facility: CLINIC | Age: 24
End: 2019-11-18

## 2019-11-18 PROCEDURE — 0298T PR EXT ECG > 48HR TO 21 DAY REVIEW AND INTERPRETATN: CPT | Performed by: INTERNAL MEDICINE

## 2019-12-02 ENCOUNTER — OFFICE VISIT (OUTPATIENT)
Dept: CARDIOLOGY | Facility: CLINIC | Age: 24
End: 2019-12-02

## 2019-12-02 VITALS
HEART RATE: 76 BPM | DIASTOLIC BLOOD PRESSURE: 60 MMHG | SYSTOLIC BLOOD PRESSURE: 100 MMHG | BODY MASS INDEX: 18.31 KG/M2 | HEIGHT: 61 IN | WEIGHT: 97 LBS

## 2019-12-02 DIAGNOSIS — R63.6 UNDERWEIGHT: ICD-10-CM

## 2019-12-02 DIAGNOSIS — I34.0 NON-RHEUMATIC MITRAL REGURGITATION: ICD-10-CM

## 2019-12-02 DIAGNOSIS — R00.2 PALPITATIONS: Primary | ICD-10-CM

## 2019-12-02 PROCEDURE — 99213 OFFICE O/P EST LOW 20 MIN: CPT | Performed by: NURSE PRACTITIONER

## 2019-12-02 RX ORDER — PROPRANOLOL HYDROCHLORIDE 10 MG/1
TABLET ORAL
Qty: 60 TABLET | Refills: 8 | Status: SHIPPED | OUTPATIENT
Start: 2019-12-02 | End: 2020-06-04

## 2019-12-02 RX ORDER — FLUDROCORTISONE ACETATE 0.1 MG/1
TABLET ORAL
Qty: 30 TABLET | Refills: 8 | Status: SHIPPED | OUTPATIENT
Start: 2019-12-02 | End: 2020-06-04

## 2019-12-02 NOTE — PROGRESS NOTES
Chief Complaint   Patient presents with   • Follow-up     For cardiac management. Patient is not on aspirin. Last lab work was done on 10/28/19 per Alla in chart under labs.    • Med Refill     Does not need refills at this time. Went over medications verbally. Is taking a half tablet of digoxin.    • Chest Pain     Reports that she has been having chest pain that is usually sharp. Reports that it usuallty is worse when she is at work. Reports that she does get short of breath with the chest pains.    • Rapid Heart Rate     States that she feels like her heart is beating too fast. States that she does not think that digoxin is doing anything and would like to be put on something else.    • Dizziness     States that she occasionally has dizziness, but states that it is not a lot.       Cardiac Complaints  palpitations      Subjective   Delroy Robins is a 24 y.o. female diagnosed with some kind of cardiac problems around 7 years old and she then developed palpitations and followed by pediatric cardiologist and started on Lanoxin. In 2016, she saw Dr. Abreu to establish cardiac care. An echocardiogram with bubble study was ordered and results were negative for shunting of blood and LV ejection fraction was in normal range. In August 2018, she was seen due to palpitations but felt were related to amitiza. After stopping the medication, her palpitations had improved. Later, the dose of Lanoxin was decreased to 125 mcg daily.  Holter was advised in October as she continued to have palpitations not well managed with digoxin.  Holter revealed NSR with no arrhythmias and high heart rate of 91, digoxin was continued.    She returns today for follow up and reports continued palpitations.  She thinks the digoxin is no longer helping as she feels she has become intolerant to current. Patient requests that medication be changed.  She admits on the weekends she has no concerns and does very well. Patient reports her only  problem is when she has to work and only notices them on those days.  She reports that when she has them it feels like a racing. She feels some tightness in her chest when she feels like she is having palpitations. Dizziness reported as rare and only if she feels like she has not had enough to drink. Labs were most recently done in October. Labs from that time show:    Mag 2.1, Dig level 1.3, TSH 2.310, GLU 98, BUN 12, Creatinine 0.65, Na 138, K 3.9, ALT 11, AST 11, .            Cardiac History  Past Surgical History:   Procedure Laterality Date   • ECHO - CONVERTED  01/25/2016    EF 50-55%, negative bubble study, RVSP 19 mmHg       Current Outpatient Medications   Medication Sig Dispense Refill   • fludrocortisone 0.1 MG tablet Take on days when taking propanolol 30 tablet 8   • propranolol (INDERAL) 10 MG tablet 1 tablet daily with one as needed for palpitations for a second dose. 60 tablet 8     No current facility-administered medications for this visit.        Patient has no known allergies.    Past Medical History:   Diagnosis Date   • Jaw surgery    • Migraine headache    • Staph    • tubes        Social History     Socioeconomic History   • Marital status: Single     Spouse name: Not on file   • Number of children: Not on file   • Years of education: Not on file   • Highest education level: Not on file   Tobacco Use   • Smoking status: Never Smoker   • Smokeless tobacco: Never Used   Substance and Sexual Activity   • Alcohol use: No   • Drug use: No       History reviewed. No pertinent family history.    Review of Systems   Constitution: Negative for weakness and malaise/fatigue.   Cardiovascular: Positive for palpitations. Negative for chest pain, claudication, dyspnea on exertion, irregular heartbeat, leg swelling, near-syncope, orthopnea and syncope.   Respiratory: Negative for cough, shortness of breath and wheezing.    Musculoskeletal: Negative for back pain, joint pain and joint swelling.  "  Gastrointestinal: Negative for anorexia, heartburn, nausea and vomiting.   Genitourinary: Negative for dysuria, hematuria, hesitancy and nocturia.   Neurological: Negative for dizziness, light-headedness and loss of balance.   Psychiatric/Behavioral: Negative for depression and memory loss. The patient is not nervous/anxious.            Objective     /60 (BP Location: Right arm)   Pulse 76   Ht 154.9 cm (60.98\")   Wt 44 kg (97 lb)   BMI 18.34 kg/m²     Physical Exam   Constitutional: She is oriented to person, place, and time. She appears well-developed and well-nourished.   HENT:   Head: Normocephalic and atraumatic.   Eyes: EOM are normal. Pupils are equal, round, and reactive to light.   Neck: Normal range of motion. Neck supple.   Cardiovascular: Normal rate and regular rhythm.   Murmur heard.  Pulmonary/Chest: Effort normal and breath sounds normal.   Abdominal: Soft.   Musculoskeletal: Normal range of motion.   Neurological: She is alert and oriented to person, place, and time.   Skin: Skin is warm and dry.   Psychiatric: She has a normal mood and affect. Her behavior is normal.       Procedures    Assessment/Plan     Palpitations:  Patient feels like her digoxin is no longer effective in palpitation management and still reports tachycardia often.  She will be urged to d/c therapy and begin with propanolol therapy at 10mg daily with extra dose to be used if needed for palpitations/tachycardia after first dose.  Limited caffeine intake encouraged. Most recent holter report discussed with patient with NSR noted and high heart rate of 91.  Florinef therapy urged to be taken with her beta blocker therapy once daily to avoid unsafe drop in blood pressure as her current is low normal at 100/60.  Adequate water intake encouraged.    Murmur:  Most recent echo showed normal LV function with no shunt noted and no significant MR.  No repeat echo will be advised at this time but will be considered next visit. "     Labs most recently checked by our office in October with no significant abnormality noted.     Medication sent to pharmacy.    BMI remains low at 18.34 but similar to prior. She will continue on good cardiac diet with continued activity as tolerated.  Protein supplementation urged.    6 month follow up recommended or sooner if needed.              Problems Addressed this Visit        Cardiovascular and Mediastinum    Palpitations - Primary    Non-rheumatic mitral regurgitation    Relevant Medications    propranolol (INDERAL) 10 MG tablet      Other Visit Diagnoses     Underweight              Patient's Body mass index is 18.34 kg/m². BMI is below normal parameters. Recommendations include: referral to primary care.                  Electronically signed by SOFIYA Valencia December 2, 2019 6:09 PM

## 2020-06-02 ENCOUNTER — TELEPHONE (OUTPATIENT)
Dept: CARDIOLOGY | Facility: CLINIC | Age: 25
End: 2020-06-02

## 2020-07-13 ENCOUNTER — TELEPHONE (OUTPATIENT)
Dept: CARDIOLOGY | Facility: CLINIC | Age: 25
End: 2020-07-13

## 2021-07-28 ENCOUNTER — OFFICE VISIT (OUTPATIENT)
Dept: CARDIOLOGY | Facility: CLINIC | Age: 26
End: 2021-07-28

## 2021-07-28 VITALS
HEART RATE: 80 BPM | DIASTOLIC BLOOD PRESSURE: 60 MMHG | HEIGHT: 61 IN | BODY MASS INDEX: 18.96 KG/M2 | WEIGHT: 100.4 LBS | SYSTOLIC BLOOD PRESSURE: 92 MMHG

## 2021-07-28 DIAGNOSIS — D50.9 MICROCYTIC HYPOCHROMIC ANEMIA: ICD-10-CM

## 2021-07-28 DIAGNOSIS — R55 SYNCOPE AND COLLAPSE: ICD-10-CM

## 2021-07-28 DIAGNOSIS — R42 DIZZINESS: ICD-10-CM

## 2021-07-28 DIAGNOSIS — I34.0 NON-RHEUMATIC MITRAL REGURGITATION: ICD-10-CM

## 2021-07-28 DIAGNOSIS — R00.2 PALPITATIONS: Primary | ICD-10-CM

## 2021-07-28 DIAGNOSIS — R00.0 TACHYCARDIA: ICD-10-CM

## 2021-07-28 PROCEDURE — 99214 OFFICE O/P EST MOD 30 MIN: CPT | Performed by: NURSE PRACTITIONER

## 2021-07-28 RX ORDER — ESCITALOPRAM OXALATE 20 MG/1
20 TABLET ORAL DAILY
COMMUNITY

## 2021-07-28 RX ORDER — FLUDROCORTISONE ACETATE 0.1 MG/1
0.1 TABLET ORAL DAILY
Qty: 30 TABLET | Refills: 8 | Status: SHIPPED | OUTPATIENT
Start: 2021-07-28

## 2021-07-28 RX ORDER — PRENATAL VIT NO.126/IRON/FOLIC 28MG-0.8MG
TABLET ORAL DAILY
COMMUNITY

## 2021-07-28 RX ORDER — TRAZODONE HYDROCHLORIDE 50 MG/1
50 TABLET ORAL NIGHTLY
COMMUNITY

## 2021-07-28 NOTE — PROGRESS NOTES
"Chief Complaint   Patient presents with   • Follow-up     Cardiac management   • Lab     Last labs 1 1/2 weeks ago per PCP. States \"I am to have a heart scan by my family doctor\".   • Palpitations     Has had 2 episodes with heart racing, dizziness, and \"blacked out\". 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 Went to [facility] ER about a month ago for fainting.   • Blood pressure     States \"they say my B/P is low\".   • Med Refill     She is uncertain on refills on Metoprolol. Did not bring medication list, went over medications verbally.     Subjective       Delroy Robins is a 25 y.o. female diagnosed with some kind of cardiac problem around 7 years old and then developed palpitations.  She was followed by pediatric cardiologist at  treated with Lanoxin. In 2016, she established care here.  Echo with bubble study was normal. In August 2018, palpitations worsened felt to be related to Amitiza.  Symptoms improved after stopping the medication.  Later, Lanoxin was tapered and stopped as she continued to have palpitations, she did not feel well controlled with digoxin. Holter revealed NSR with no significant arrhythmias.  She continued to c/o palpitations and dizziness.  Started on propranolol 10 mg once daily with extra dose as needed for palpitation.  Fludrocortisone 0.1 mg daily added to prevent hypotension.  We did not see her after this.  Appears she requested second opinion with Dr. Duarte, but no report available.    She returns today to be evaluated for syncope.  Since she was last seen in 2019, appears beta-blocker changed from propranolol to metoprolol 25 mg 1/2 tablet daily with extra half tablet as needed.  At this time, she feels the palpitations are fairly well controlled but now having more dizziness and syncope.  2 episodes of syncope occurring on memorial day then again on July 14.  The first episode occurred at a family gathering/picnic.  She placed her plate on the table suddenly became dizzy, walked " away from the table and fell to the ground.  Apparently she was out for a few seconds.  She does not recall having significant palpitations prior to syncope.  The second episode occurred while climbing stairs at work.  Again she had no palpitations but felt lightheaded then passed out.  Upon questioning, she does not recall taking fludrocortisone.  She drinks mostly Mt Dew and admits to not eating well.  She vapes 6 mg nicotine cartridge.  Denies alcohol or illicit drug use.  She is chronically anemic with Hgb around 8-9.  According to her, upper and lower endoscopy were normal.  She has irregular menstruation but only 1 to 2 days of heavy flow.  She was seen by Dr. Young, iron supplement recommended with plan for iron infusion versus blood transfusion if she did not respond.  According to her she was later told by gynecologist only needed to take prenatal vitamin.         Cardiac History:    Past Surgical History:   Procedure Laterality Date   • CONVERTED (HISTORICAL) HOLTER  10/28/2019    7-day-AVG 91 bpm, min/max 44/152, rare PAC, no SVT, AF, VT   • ECHO - CONVERTED  01/25/2016    EF 50-55%, negative bubble study, RVSP 19 mmHg     Current Outpatient Medications   Medication Sig Dispense Refill   • escitalopram (LEXAPRO) 20 MG tablet Take 20 mg by mouth Daily.     • metoprolol tartrate (LOPRESSOR) 25 MG tablet Take 1/2 tablet once a day, can take an extra 1/2 tablet if needed for palpitations if blood pressure stable 30 tablet 6   • prenatal vitamin (prenatal, CLASSIC, vitamin) tablet Take  by mouth Daily.     • traZODone (DESYREL) 50 MG tablet Take 50 mg by mouth Every Night.     • fludrocortisone 0.1 MG tablet Take 1 tablet by mouth Daily. 30 tablet 8     No current facility-administered medications for this visit.     Patient has no known allergies.    Past Medical History:   Diagnosis Date   • Jaw surgery    • Migraine headache    • Staph    • tubes      Social History     Socioeconomic History   • Marital  "status: Single     Spouse name: Not on file   • Number of children: Not on file   • Years of education: Not on file   • Highest education level: Not on file   Tobacco Use   • Smoking status: Never Smoker   • Smokeless tobacco: Never Used   Vaping Use   • Vaping Use: Every day   • Substances: Nicotine, Flavoring   Substance and Sexual Activity   • Alcohol use: No   • Drug use: No     History reviewed. No pertinent family history.    Review of Systems   Constitutional: Positive for weight gain (up 3 lb). Negative for decreased appetite and malaise/fatigue.   HENT: Negative.    Eyes: Negative for blurred vision.   Cardiovascular: Positive for palpitations and syncope. Negative for chest pain, dyspnea on exertion and leg swelling.   Respiratory: Negative for shortness of breath and sleep disturbances due to breathing.    Endocrine: Negative.    Hematologic/Lymphatic: Negative for bleeding problem. Does not bruise/bleed easily.   Skin: Negative.    Musculoskeletal: Negative for falls and myalgias.        Scoliosis    Gastrointestinal: Negative for abdominal pain, heartburn and melena.   Genitourinary: Negative for hematuria.   Neurological: Positive for dizziness and light-headedness.   Psychiatric/Behavioral: Positive for depression. Negative for altered mental status. The patient is nervous/anxious.    Allergic/Immunologic: Negative.       Objective     BP 92/60 (BP Location: Right arm)   Pulse 80   Ht 154.9 cm (60.98\")   Wt 45.5 kg (100 lb 6.4 oz)   BMI 18.98 kg/m²     Vitals and nursing note reviewed.   Constitutional:       General: Not in acute distress.     Appearance: Well-developed. Not diaphoretic.   Eyes:      Pupils: Pupils are equal, round, and reactive to light.   HENT:      Head: Normocephalic.   Pulmonary:      Effort: Pulmonary effort is normal. No respiratory distress.      Breath sounds: Normal breath sounds.   Cardiovascular:      Normal rate. Regular rhythm.      Murmurs: There is a grade 1 to " 2/6 systolic murmur at the URSB and apex.   Pulses:     Intact distal pulses.   Edema:     Peripheral edema absent.   Abdominal:      General: Bowel sounds are normal.      Palpations: Abdomen is soft.   Musculoskeletal: Normal range of motion.      Cervical back: Normal range of motion. Skin:     General: Skin is warm and dry.   Neurological:      Mental Status: Alert and oriented to person, place, and time.        Procedures          Problem List Items Addressed This Visit        Cardiac and Vasculature    Palpitations - Primary    Relevant Orders    [facility]patient Tele[facility]    Adult Transthoracic Echo Complete W/ Cont if Necessary Per Protocol    Non-rheumatic mitral regurgitation    Relevant Orders    [facility] Outpatient Telemetry    Adult Transthoracic Echo Complete W/ Cont if Necessary Per Protocol    Tachycardia    Relevant Orders    [facility] Outpatient Telemetry    Adult Transthoracic Echo Complete W/ Cont if Necessary Per Protocol       Hematology and Neoplasia    Microcytic hypochromic anemia       Symptoms and Signs    Syncope and collapse    Relevant Orders    [facility]iac Outpatient Telemetry    Adult Transthoracic Echo Complete W/ Cont if Necessary Per Protocol    Dizziness         1.  Syncope/palpitations -appears to be neurocardiogenic syncope/vasovagal, likely exacerbated by anemia, decreased p.o. intake, beta-blocker without volume expander.  We discussed this in great detail.  At this time, continue metoprolol 12.5 mg daily.  Advised to add fludrocortisone 0.1 mg daily.  Push p.o. fluids, ideally electrolyte solution/Gatorade.  Advised to eat a salty snack when she is dizzy.  Compression socks may be helpful.  30-day event monitor ordered to rule out arrhythmia, SVT.  Repeat echocardiogram to evaluate heart murmur, mitral regurgitation.  If event monitor shows no arrhythmia and syncope recurs, recommend tilt table test to further evaluate.  She is advised to see hematology  or follow primary care recommendation to correct significant anemia.  Iron rich foods encouraged.    She is encouraged to avoid any stimulant which can provoke the tachycardia/palpitation.  Caffeine and nicotine need to be avoided.  She is going to consider weaning dose of nicotine and her vaping cartridge.    Further recommendations to follow echocardiogram and 30-day monitor.  Hopefully she will respond well to fludrocortisone.  I explained this medication and purpose in detail.  I will see her back in 3 months for follow-up.    Patient's Body mass index is 18.98 kg/m². indicating that she is within normal range (BMI 18.5-24.9). No BMI management plan needed..               Electronically signed by SOFIYA Hansen,  July 28, 2021 11:03 EDT

## 2021-09-30 ENCOUNTER — TELEPHONE (OUTPATIENT)
Dept: CARDIOLOGY | Facility: CLINIC | Age: 26
End: 2021-09-30

## 2021-12-21 ENCOUNTER — TELEPHONE (OUTPATIENT)
Dept: CARDIOLOGY | Facility: CLINIC | Age: 26
End: 2021-12-21

## 2021-12-21 NOTE — TELEPHONE ENCOUNTER
Patient made aware recommendation that metoprolol be changed to labetalol, would need low dose due to hypotension, would also be hesitant to use Fludrocortisone due to no data to determine risk to baby. Patient verbalized understanding and reports she will talk with gynecologist about changing metoprolol to labetalol due to gynecologist has been refilling metoprolol.

## 2021-12-21 NOTE — TELEPHONE ENCOUNTER
"Patient called stating \"I am now pregnant, 6-8 weeks. I was wondering if safe to take Metoprolol during pregnancy?\"  "

## 2021-12-21 NOTE — TELEPHONE ENCOUNTER
Would recommend that she change to labetalol.    Would need to be a very low dose due to hypotension.  She needs to keep her follow-up appointments.    I would also be hesitant to use fludrocortisone as there is no data to determine risk to baby.

## 2022-03-04 NOTE — TELEPHONE ENCOUNTER
Patient made aware of MCOT results, she reports doing better only noticed one episode of dizziness, she has fludrocortisone yet not taking at this time, [place]    Patient reports she still has MCOT monitor, has not returned due to does not have box to send monitor back. Patient given  number to luz Lang call to obtain box to send monitor back.  
Breath sounds clear and equal bilaterally.